# Patient Record
(demographics unavailable — no encounter records)

---

## 2024-12-09 NOTE — ASSESSMENT
[Weight Loss] : weight loss [Denosumab Therapy] : Risks  and benefits of denosumab therapy were discussed with the patient including eczema, cellulitis, osteonecrosis of the jaw and atypical femur fractures [FreeTextEntry1] : Osteoporosis has h/o underlying breast cancer was on anastrozole and exemestane, completed therapy Denies h/o fall or fragility fractures, has arthralgias Secondary causes largely negative  Reviewed DXA from Sept 2022, due to repeat DXA scan, will consider use of bisphosphonates vs Prolia for patient Seeing ortho, recently had hip pain, not likely from Prolia, recent imaging did not note femur fracture, but noted labrum tear. Also has hip flexor tendonitis. Due to see rheumatologist and ortho for her joint pains. 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

## 2024-12-09 NOTE — HISTORY OF PRESENT ILLNESS
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 Has been on anastrazole since until 2018, and has been off exemestane as well. She takes vitamin 2000IU po daily. She does not calcium supplementation. No known h/o parathyroid or calcium disorders. She notes h/o prediabetes, never on medication. She checks fs occasionally, AM fasting ranges from 80-90s.   She was approved for Prolia therapy, and last dose was in June 2023 and tolerated well without issues  She was noted to have hip pain recently, saw ortho and noted to have hip flexor tendonitis, She also notes struggle with weight and notes it does put pressure on her joints; she is planning to see a rheumatologist soon.